# Patient Record
Sex: MALE | Race: WHITE | NOT HISPANIC OR LATINO | Employment: FULL TIME | URBAN - METROPOLITAN AREA
[De-identification: names, ages, dates, MRNs, and addresses within clinical notes are randomized per-mention and may not be internally consistent; named-entity substitution may affect disease eponyms.]

---

## 2017-02-10 ENCOUNTER — TRANSCRIBE ORDERS (OUTPATIENT)
Dept: ADMINISTRATIVE | Facility: HOSPITAL | Age: 67
End: 2017-02-10

## 2017-02-10 DIAGNOSIS — R53.83 OTHER FATIGUE: Primary | ICD-10-CM

## 2024-03-13 ENCOUNTER — PATIENT ROUNDING (BHMG ONLY) (OUTPATIENT)
Dept: CARDIOLOGY | Facility: CLINIC | Age: 74
End: 2024-03-13
Payer: MEDICARE

## 2024-03-13 ENCOUNTER — OFFICE VISIT (OUTPATIENT)
Dept: CARDIOLOGY | Facility: CLINIC | Age: 74
End: 2024-03-13
Payer: MEDICARE

## 2024-03-13 VITALS
BODY MASS INDEX: 28.35 KG/M2 | DIASTOLIC BLOOD PRESSURE: 70 MMHG | HEART RATE: 68 BPM | WEIGHT: 191.4 LBS | SYSTOLIC BLOOD PRESSURE: 122 MMHG | HEIGHT: 69 IN | OXYGEN SATURATION: 96 %

## 2024-03-13 DIAGNOSIS — R06.02 SHORTNESS OF BREATH: Primary | ICD-10-CM

## 2024-03-13 PROCEDURE — 99203 OFFICE O/P NEW LOW 30 MIN: CPT | Performed by: INTERNAL MEDICINE

## 2024-03-13 RX ORDER — ATORVASTATIN CALCIUM 40 MG/1
40 TABLET, FILM COATED ORAL
COMMUNITY

## 2024-03-13 RX ORDER — PHENOL 1.4 %
600 AEROSOL, SPRAY (ML) MUCOUS MEMBRANE DAILY
COMMUNITY

## 2024-03-13 RX ORDER — MULTIPLE VITAMINS W/ MINERALS TAB 9MG-400MCG
1 TAB ORAL DAILY
COMMUNITY

## 2024-03-13 NOTE — PROGRESS NOTES
"[facility]  Consultation H&P  Cisco Penny  1950  [phone]  There is no work phone number on file..    VISIT DATE:  03/13/2024    PCP: Sam Antunez MD  [address] [facility] [address]  [facility] [address]    CC:  Chief Complaint   Patient presents with    Abnormal ECG         ASSESSMENT:   Diagnosis Plan   1. Shortness of breath  Treadmill Stress Test    Adult Transthoracic Echo Complete W/ Cont if Necessary Per Protocol            PLAN:  -Exercise treadmill test pending for ischemia evaluation.  -Transthoracic echo pending to assess underlying myocardial structure and function.  -Overall cardiac risk factors appear to be well-controlled.  If cardiac testing is unremarkable then he will be able to follow-up with cardiology on an as-needed basis.  Encouraged regular exercise.    History of Present Illness   73-year-old gentleman with a history of dyslipidemia presenting with fatigue and intermittent exertional shortness of breath.  Has typically been a very active individual who exercises on a regular basis.  Due to his work-related schedule he has not been exercising as intently over the previous 6 to 12 months.  Has been noticing generalized fatigue which is out of proportion to his usual state of health and shortness of breath and a mild class II pattern intermittently.  Blood pressures running less than 130/80 mmHg.  Compliant with statin therapy, underlying excellent control of hyperlipidemia.  Twelve-lead EKG reveals poor precordial R wave progression, potentially consistent with underlying focal LVH versus anteroseptal MI.    PHYSICAL EXAMINATION:  Vitals:    03/13/24 0833   BP: 122/70   BP Location: Left arm   Patient Position: Sitting   Pulse: 68   SpO2: 96%   Weight: 86.8 kg (191 lb 6.4 oz)   Height: 175.3 cm (69\")     General Appearance:    Alert, cooperative, no distress, appears stated age   Head:    Normocephalic, without obvious abnormality, " "atraumatic   Eyes:    conjunctiva/corneas clear, EOM's intact, fundi     benign, both eyes   Ears:    Normal TM's and external ear canals, both ears   Nose:   Nares normal, septum midline, mucosa normal, no drainage    or sinus tenderness   Throat:   Lips, mucosa, and tongue normal; teeth and gums normal   Neck:   Supple, symmetrical, trachea midline, no adenopathy;     thyroid:  no enlargement/tenderness/nodules; no carotid    bruit or JVD   Back:     Symmetric, no curvature, ROM normal, no CVA tenderness   Lungs:     Clear to auscultation bilaterally, respirations unlabored   Chest Wall:    No tenderness or deformity    Heart:    Regular rate and rhythm, S1 and S2 normal, 1/6 early peaking systolic murmur right upper sternal border, no rub   or gallop, normal carotid impulse bilaterally without bruit.   Abdomen:     Soft, non-tender, bowel sounds active all four quadrants,     no masses, no organomegaly   Extremities:   Extremities normal, atraumatic, no cyanosis or edema   Pulses:   2+ and symmetric all extremities   Skin:   Skin color, texture, turgor normal, no rashes or lesions   Lymph nodes:   Cervical, supraclavicular, and axillary nodes normal   Neurologic:   normal strength, sensation intact     throughout       Diagnostic Data:  Procedures  No results found for: \"CHLPL\", \"TRIG\", \"HDL\", \"LDLDIRECT\"  No results found for: \"GLUCOSE\", \"BUN\", \"CREATININE\", \"NA\", \"K\", \"CL\", \"CO2\"  No results found for: \"HGBA1C\"  No results found for: \"WBC\", \"HGB\", \"HCT\", \"PLT\"    PROBLEM LIST:  There is no problem list on file for this patient.      PAST MEDICAL HX  History reviewed. No pertinent past medical history.    Allergies  No Known Allergies    Current Medications    Current Outpatient Medications:     atorvastatin (LIPITOR) 40 MG tablet, Take 1 tablet by mouth Every Other Day., Disp: , Rfl:     calcium carbonate (OS-DAVID) 600 MG tablet, Take 1 tablet by mouth Daily., Disp: , Rfl:     multivitamin with minerals tablet " tablet, Take 1 tablet by mouth Daily., Disp: , Rfl:     vitamin D3 125 MCG (5000 UT) capsule capsule, Take 1 capsule by mouth Daily., Disp: , Rfl:          ROS  ROS      SOCIAL HX  Social History     Socioeconomic History    Marital status:    Tobacco Use    Smoking status: Never    Smokeless tobacco: Never   Vaping Use    Vaping status: Never Used    Passive vaping exposure: Yes   Substance and Sexual Activity    Alcohol use: Not Currently     Comment: quit 4 yrs ago    Drug use: Never    Sexual activity: Defer       FAMILY HX  Family History   Problem Relation Age of Onset    Heart disease Mother     Lupus Mother     Stomach cancer Father     Parkinsonism Sister     Parkinsonism Brother              Heber León III, MD, FACC

## 2024-03-13 NOTE — PROGRESS NOTES
March 13, 2024    Hello, may I speak with Cisco Penny? Yes.    My name is Allie MORALES      I am  with [facility]  [address]  [address]  [facility] [address] [phone].    Before we get started may I verify your date of birth? 1950, Yes, correct.    I am calling to officially welcome you to our practice and ask about your recent visit. Is this a good time to talk? Yes.    Tell me about your visit with us. What things went well?  All went well, Dr. León explained everything in great order and very personable & very nice.       We're always looking for ways to make our patients' experiences even better. Do you have recommendations on ways we may improve?  No.    Overall were you satisfied with your first visit to our practice? Yes.       I appreciate you taking the time to speak with me today. Is there anything else I can do for you? No.      Thank you, and have a great day.

## 2024-03-21 ENCOUNTER — HOSPITAL ENCOUNTER (OUTPATIENT)
Dept: CARDIOLOGY | Facility: HOSPITAL | Age: 74
Discharge: HOME | End: 2024-03-21
Payer: MEDICARE

## 2024-03-21 ENCOUNTER — TELEPHONE (OUTPATIENT)
Dept: CARDIOLOGY | Facility: CLINIC | Age: 74
End: 2024-03-21

## 2024-03-21 VITALS
HEART RATE: 65 BPM | SYSTOLIC BLOOD PRESSURE: 150 MMHG | DIASTOLIC BLOOD PRESSURE: 98 MMHG | WEIGHT: 191.36 LBS | OXYGEN SATURATION: 96 % | BODY MASS INDEX: 28.34 KG/M2 | HEIGHT: 69 IN

## 2024-03-21 VITALS — HEIGHT: 69 IN | BODY MASS INDEX: 28.34 KG/M2 | WEIGHT: 191.36 LBS

## 2024-03-21 DIAGNOSIS — R06.02 SHORTNESS OF BREATH: ICD-10-CM

## 2024-03-21 LAB
BH CV ECHO MEAS - AO MAX PG: 5.1 MMHG
BH CV ECHO MEAS - AO MEAN PG: 3 MMHG
BH CV ECHO MEAS - AO ROOT DIAM: 3.8 CM
BH CV ECHO MEAS - AO V2 MAX: 112.5 CM/SEC
BH CV ECHO MEAS - AO V2 VTI: 21.5 CM
BH CV ECHO MEAS - AVA(I,D): 2.8 CM2
BH CV ECHO MEAS - EDV(CUBED): 66.1 ML
BH CV ECHO MEAS - EDV(MOD-SP2): 75.3 ML
BH CV ECHO MEAS - EDV(MOD-SP4): 102 ML
BH CV ECHO MEAS - EF(MOD-BP): 60.5 %
BH CV ECHO MEAS - EF(MOD-SP2): 58.2 %
BH CV ECHO MEAS - EF(MOD-SP4): 61.7 %
BH CV ECHO MEAS - ESV(CUBED): 13.4 ML
BH CV ECHO MEAS - ESV(MOD-SP2): 31.5 ML
BH CV ECHO MEAS - ESV(MOD-SP4): 39.1 ML
BH CV ECHO MEAS - FS: 41.2 %
BH CV ECHO MEAS - IVS/LVPW: 0.99 CM
BH CV ECHO MEAS - IVSD: 0.96 CM
BH CV ECHO MEAS - LA DIMENSION: 4 CM
BH CV ECHO MEAS - LAT PEAK E' VEL: 6.3 CM/SEC
BH CV ECHO MEAS - LV DIASTOLIC VOL/BSA (35-75): 50.4 CM2
BH CV ECHO MEAS - LV MASS(C)D: 123.2 GRAMS
BH CV ECHO MEAS - LV MAX PG: 3.9 MMHG
BH CV ECHO MEAS - LV MEAN PG: 2.14 MMHG
BH CV ECHO MEAS - LV SYSTOLIC VOL/BSA (12-30): 19.3 CM2
BH CV ECHO MEAS - LV V1 MAX: 98.4 CM/SEC
BH CV ECHO MEAS - LV V1 VTI: 19.9 CM
BH CV ECHO MEAS - LVIDD: 4 CM
BH CV ECHO MEAS - LVIDS: 2.38 CM
BH CV ECHO MEAS - LVOT AREA: 3 CM2
BH CV ECHO MEAS - LVOT DIAM: 1.96 CM
BH CV ECHO MEAS - LVPWD: 0.97 CM
BH CV ECHO MEAS - MED PEAK E' VEL: 5.1 CM/SEC
BH CV ECHO MEAS - MV A MAX VEL: 76.4 CM/SEC
BH CV ECHO MEAS - MV DEC SLOPE: 223 CM/SEC2
BH CV ECHO MEAS - MV DEC TIME: 0.32 SEC
BH CV ECHO MEAS - MV E MAX VEL: 60.5 CM/SEC
BH CV ECHO MEAS - MV E/A: 0.79
BH CV ECHO MEAS - MV MAX PG: 4 MMHG
BH CV ECHO MEAS - MV MEAN PG: 1.3 MMHG
BH CV ECHO MEAS - MV P1/2T: 90.4 MSEC
BH CV ECHO MEAS - MV V2 VTI: 23.8 CM
BH CV ECHO MEAS - MVA(P1/2T): 2.43 CM2
BH CV ECHO MEAS - MVA(VTI): 2.5 CM2
BH CV ECHO MEAS - PA ACC TIME: 0.13 SEC
BH CV ECHO MEAS - RAP SYSTOLE: 3 MMHG
BH CV ECHO MEAS - RVSP: 23 MMHG
BH CV ECHO MEAS - SI(MOD-SP2): 21.6 ML/M2
BH CV ECHO MEAS - SI(MOD-SP4): 31.1 ML/M2
BH CV ECHO MEAS - SV(LVOT): 60.1 ML
BH CV ECHO MEAS - SV(MOD-SP2): 43.8 ML
BH CV ECHO MEAS - SV(MOD-SP4): 62.9 ML
BH CV ECHO MEAS - TAPSE (>1.6): 1.67 CM
BH CV ECHO MEAS - TR MAX PG: 17.3 MMHG
BH CV ECHO MEAS - TR MAX VEL: 207.3 CM/SEC
BH CV ECHO MEASUREMENTS AVERAGE E/E' RATIO: 10.61
BH CV STRESS BP STAGE 1: NORMAL
BH CV STRESS BP STAGE 2: NORMAL
BH CV STRESS DURATION MIN STAGE 1: 3
BH CV STRESS DURATION MIN STAGE 2: 2
BH CV STRESS DURATION SEC STAGE 1: 0
BH CV STRESS DURATION SEC STAGE 2: 53
BH CV STRESS GRADE STAGE 1: 10
BH CV STRESS GRADE STAGE 2: 12
BH CV STRESS HR STAGE 1: 109
BH CV STRESS HR STAGE 2: 134
BH CV STRESS METS STAGE 1: 5
BH CV STRESS METS STAGE 2: 7.5
BH CV STRESS O2 STAGE 1: 95
BH CV STRESS O2 STAGE 2: 96
BH CV STRESS PROTOCOL 1: NORMAL
BH CV STRESS RECOVERY BP: NORMAL MMHG
BH CV STRESS RECOVERY HR: 81 BPM
BH CV STRESS RECOVERY O2: 99 %
BH CV STRESS SPEED STAGE 1: 1.7
BH CV STRESS SPEED STAGE 2: 2.5
BH CV STRESS STAGE 1: 1
BH CV STRESS STAGE 2: 2
BH CV VAS BP RIGHT ARM: NORMAL MMHG
BH CV XLRA - RV BASE: 2.5 CM
BH CV XLRA - RV LENGTH: 6.1 CM
BH CV XLRA - RV MID: 2.11 CM
BH CV XLRA - TDI S': 8.1 CM/SEC
LEFT ATRIUM VOLUME INDEX: 29.4 ML/M2
MAXIMAL PREDICTED HEART RATE: 149 BPM
PERCENT MAX PREDICTED HR: 91.28 %
STRESS BASELINE BP: NORMAL MMHG
STRESS BASELINE HR: 65 BPM
STRESS O2 SAT REST: 96 %
STRESS PERCENT HR: 107 %
STRESS POST ESTIMATED WORKLOAD: 7 METS
STRESS POST EXERCISE DUR MIN: 5 MIN
STRESS POST EXERCISE DUR SEC: 53 SEC
STRESS POST O2 SAT PEAK: 96 %
STRESS POST PEAK BP: NORMAL MMHG
STRESS POST PEAK HR: 136 BPM
STRESS TARGET HR: 127 BPM

## 2024-03-21 PROCEDURE — 93306 TTE W/DOPPLER COMPLETE: CPT

## 2024-03-21 PROCEDURE — 93017 CV STRESS TEST TRACING ONLY: CPT

## 2024-03-21 NOTE — TELEPHONE ENCOUNTER
----- Message from Heber León III, MD sent at 3/21/2024  2:23 PM EDT -----  Strength of heart muscle is normal, no significant normalities noted on echo.